# Patient Record
Sex: MALE | Race: WHITE | Employment: UNEMPLOYED | URBAN - METROPOLITAN AREA
[De-identification: names, ages, dates, MRNs, and addresses within clinical notes are randomized per-mention and may not be internally consistent; named-entity substitution may affect disease eponyms.]

---
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
## 2024-09-04 ENCOUNTER — HOSPITAL ENCOUNTER (EMERGENCY)
Age: 19
Discharge: HOME | End: 2024-09-04
Attending: STUDENT IN AN ORGANIZED HEALTH CARE EDUCATION/TRAINING PROGRAM
Payer: COMMERCIAL

## 2024-09-04 VITALS
BODY MASS INDEX: 21.19 KG/M2 | RESPIRATION RATE: 18 BRPM | TEMPERATURE: 98.4 F | SYSTOLIC BLOOD PRESSURE: 143 MMHG | HEART RATE: 67 BPM | OXYGEN SATURATION: 98 % | DIASTOLIC BLOOD PRESSURE: 76 MMHG | HEIGHT: 67 IN | WEIGHT: 135 LBS

## 2024-09-04 DIAGNOSIS — R45.851 SUICIDAL THOUGHTS: Primary | ICD-10-CM

## 2024-09-04 PROCEDURE — 99282 EMERGENCY DEPT VISIT SF MDM: CPT

## 2024-09-04 ASSESSMENT — ENCOUNTER SYMPTOMS
CHEST TIGHTNESS: 0
SHORTNESS OF BREATH: 0
RHINORRHEA: 0
DIARRHEA: 0
NAUSEA: 0
SORE THROAT: 0
EYE DISCHARGE: 0
VOMITING: 0
EYE REDNESS: 0
ABDOMINAL PAIN: 0

## 2024-09-04 ASSESSMENT — PAIN - FUNCTIONAL ASSESSMENT: PAIN_FUNCTIONAL_ASSESSMENT: NONE - DENIES PAIN

## 2024-09-04 ASSESSMENT — LIFESTYLE VARIABLES
HOW OFTEN DO YOU HAVE A DRINK CONTAINING ALCOHOL: MONTHLY OR LESS
HOW MANY STANDARD DRINKS CONTAINING ALCOHOL DO YOU HAVE ON A TYPICAL DAY: 3 OR 4

## 2024-09-05 NOTE — ED NOTES
Belongings and patient checked by security. Belongings locked up. Pt in blue gown. Mode of arrival (squad #, walk in, police, etc) : [employer]        Chief complaint(s): Mental Health Evaluation        Arrival Note (brief scenario, treatment PTA, etc).: Pt was brought into the ED by [employer] due to patient sending mother text stating he wanted to hurt himself. Pt currently denies SI or HI. Pt stated he sent the messages and made statement to his mother because he knew it would get her attention. Pt states he has been going through a lot of outside stressors and was triggered this evening by his girlfriend.Pt denies visual and audio hallucinations. Pt admits to occasional marijuana use and states he only drinks socially. Pt states he just truly wants resources and someone to be able to talk to when he is feeling overwhelmed on days such as today.        C= \"Have you ever felt that you should Cut down on your drinking?\"  No  A= \"Have people Annoyed you by criticizing your drinking?\"  No  G= \"Have you ever felt bad or Guilty about your drinking?\"  No  E= \"Have you ever had a drink as an Eye-opener first thing in the morning to steady your nerves or to help a hangover?\"  No      Deferred []      Reason for deferring: N/A    *If yes to two or more: probable alcohol abuse.*  
SW assisted pt creating a safety plan. SW discussed with pt warning signs, coping skills, and people/agencies who pt can reach out for help when feeling suicidal. SW made pt aware of ways to make pt's environment safer in times of crisis. Pt provided with the National Suicide Prevention Line: 1-940.995.6836 or the text number 990160 and strongly encouraged to utilize this resource if needed.     
condition.

## 2024-09-05 NOTE — ED PROVIDER NOTES
EMERGENCY DEPARTMENT ENCOUNTER    Pt Name: David Thomas  MRN: [id number]  Birthdate 2005  Date of evaluation: 9/4/24  CHIEF COMPLAINT       Chief Complaint   Patient presents with    Suicidal     Brought by [employer]/ pink slip by [employer]      HISTORY OF PRESENT ILLNESS   This is a 19-year-old presenting under an involuntary hold by police for reported suicidal statements    Patient does admit to sending a text to his mother about suicidal statements    He states he has been suffering from depression and this is just a cry for help.  He states he just wanted to hug from his mother.  He denies any actual suicidal ideation.  He states he would never actually try to harm himself.  He just wanted someone to take him seriously so he can start seeing a counselor and talk through these type of things.  He denies any homicidal ideation no auditory visual donations no drug or alcohol use              REVIEW OF SYSTEMS     Review of Systems   Constitutional:  Negative for chills and fever.   HENT:  Negative for rhinorrhea and sore throat.    Eyes:  Negative for discharge and redness.   Respiratory:  Negative for chest tightness and shortness of breath.    Cardiovascular:  Negative for chest pain.   Gastrointestinal:  Negative for abdominal pain, diarrhea, nausea and vomiting.   Genitourinary:  Negative for dysuria and frequency.   Musculoskeletal:  Negative for arthralgias and myalgias.   Skin:  Negative for rash.   Neurological:  Negative for weakness and numbness.   Psychiatric/Behavioral:  Negative for suicidal ideas.    All other systems reviewed and are negative.    PASTMEDICAL HISTORY     Past Medical History:   Diagnosis Date    Murmur, cardiac 10/25/2012     Past Problem List  Patient Active Problem List   Diagnosis Code    Dizziness R42    Innocent heart murmur R01.0     SURGICAL HISTORY       Past Surgical History:   Procedure Laterality Date    ADENOIDECTOMY       CURRENT MEDICATIONS       Previous Medications

## 2025-05-09 ENCOUNTER — OFFICE VISIT (OUTPATIENT)
Age: 20
End: 2025-05-09

## 2025-05-09 VITALS
OXYGEN SATURATION: 95 % | SYSTOLIC BLOOD PRESSURE: 122 MMHG | WEIGHT: 130 LBS | HEIGHT: 66 IN | RESPIRATION RATE: 18 BRPM | HEART RATE: 100 BPM | BODY MASS INDEX: 20.89 KG/M2 | DIASTOLIC BLOOD PRESSURE: 80 MMHG

## 2025-05-09 DIAGNOSIS — L25.9 CONTACT DERMATITIS, UNSPECIFIED CONTACT DERMATITIS TYPE, UNSPECIFIED TRIGGER: Primary | ICD-10-CM

## 2025-05-09 RX ORDER — METHYLPREDNISOLONE 4 MG/1
TABLET ORAL
Qty: 1 KIT | Refills: 0 | Status: SHIPPED | OUTPATIENT
Start: 2025-05-09 | End: 2025-05-15

## 2025-05-09 RX ORDER — METHYLPHENIDATE HYDROCHLORIDE 54 MG/1
TABLET ORAL
COMMUNITY
Start: 2025-04-30

## 2025-05-09 ASSESSMENT — ENCOUNTER SYMPTOMS
EYE PAIN: 0
SORE THROAT: 0
ABDOMINAL PAIN: 0
ABDOMINAL DISTENTION: 0
DIARRHEA: 0
COUGH: 0
VOMITING: 0

## 2025-05-09 NOTE — PROGRESS NOTES
[facility]  [address] [place] [id number]  Phone: [phone]  Fax: [phone]      David Thomas  2005  MRN: [id number]  Date of visit: 2025    Chief Complaint:     David Thomas (:  2005) is a 20 y.o. male,New patient, here for evaluation of the following chief complaint(s):  Rash      No Known Allergies     Current Outpatient Medications   Medication Sig Dispense Refill    methylphenidate (CONCERTA) 54 MG extended release tablet       methylPREDNISolone (MEDROL DOSEPACK) 4 MG tablet Take by mouth. 1 kit 0    Amphetamine-Dextroamphetamine (ADDERALL PO) Take by mouth daily Unsure of dose       No current facility-administered medications for this visit.        Past Medical History:   Diagnosis Date    Murmur, cardiac 10/25/2012          Subjective/Objective:       History provided by:  Patient  Rash  This is a recurrent problem. The current episode started in the past 7 days. The problem has been gradually worsening since onset. The affected locations include the neck, left axilla, right axilla and right arm. The rash is characterized by burning and redness. It is unknown if there was an exposure to a precipitant. Pertinent negatives include no congestion, cough, diarrhea, eye pain, facial edema, fatigue, fever, sore throat or vomiting.       Vitals:    25 1728   BP: 122/80   Pulse: 100   Resp: 18   SpO2: 95%   Weight: 59 kg (130 lb)   Height: 1.676 m (5' 6\")       Review of Systems   Constitutional:  Negative for fatigue and fever.   HENT:  Negative for congestion and sore throat.    Eyes:  Negative for pain.   Respiratory:  Negative for cough.    Gastrointestinal:  Negative for abdominal distention, abdominal pain, diarrhea and vomiting.   Skin:  Positive for rash.   Neurological:  Negative for dizziness, light-headedness and headaches.   Hematological:  Negative for adenopathy.       Physical Exam  Constitutional:       Appearance: Normal